# Patient Record
Sex: FEMALE | Race: BLACK OR AFRICAN AMERICAN | NOT HISPANIC OR LATINO
[De-identification: names, ages, dates, MRNs, and addresses within clinical notes are randomized per-mention and may not be internally consistent; named-entity substitution may affect disease eponyms.]

---
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
## 2024-02-07 ENCOUNTER — APPOINTMENT (OUTPATIENT)
Dept: ORTHOPEDIC SURGERY | Facility: CLINIC | Age: 19
End: 2024-02-07
Payer: COMMERCIAL

## 2024-02-07 VITALS
DIASTOLIC BLOOD PRESSURE: 78 MMHG | SYSTOLIC BLOOD PRESSURE: 121 MMHG | OXYGEN SATURATION: 100 % | HEART RATE: 92 BPM | HEIGHT: 69 IN | BODY MASS INDEX: 36.29 KG/M2 | WEIGHT: 245 LBS

## 2024-02-07 DIAGNOSIS — Z00.00 ENCOUNTER FOR GENERAL ADULT MEDICAL EXAMINATION W/OUT ABNORMAL FINDINGS: ICD-10-CM

## 2024-02-07 PROCEDURE — 99214 OFFICE O/P EST MOD 30 MIN: CPT

## 2024-02-11 NOTE — PHYSICAL EXAM
[UE/LE] : Motor: 5/5 motor strength in bilateral upper & lower extremities [] : Sensory: [Normal] : No costovertebral angle tenderness and no spinal tenderness [ALL] : dorsalis pedis, posterior tibial, femoral, popliteal, and radial 2+ and symmetric bilaterally [de-identified] : General: Well-nourished, well-developed, alert, and in no acute distress. Head: Normocephalic. Eyes: Pupils equal, extraocular muscles intact, normal sclera. Nose: No nasal discharge. Cardiovascular: Extremities are warm and well perfused. Distal pulses are symmetric bilaterally. Respiratory: No labored breathing. Extremities: Sensation is intact distally bilaterally. Distal pulses are symmetric bilaterally Lymphatic: No regional lymphadenopathy, no lymphedema Neurologic: No focal deficits Skin: Normal skin color, texture, and turgor Psychiatric: Normal affect

## 2024-02-11 NOTE — ASSESSMENT
[FreeTextEntry1] : STEPHANIE TOUSSAINT is a 18 year old female presents for Preparticipation Examination.  Patient is medically cleared for participation without restriction. Documentation completed, signed, scanned into chart and provided to patient. Follow up as needed.

## 2024-02-11 NOTE — HISTORY OF PRESENT ILLNESS
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 Presents for Sports Medical Clearance for Track and Field Events Shot put, Hammer throw, Weight throw No current injuries.

## 2024-02-11 NOTE — DISCUSSION/SUMMARY
